# Patient Record
Sex: FEMALE | Race: WHITE | NOT HISPANIC OR LATINO | Employment: UNEMPLOYED | URBAN - METROPOLITAN AREA
[De-identification: names, ages, dates, MRNs, and addresses within clinical notes are randomized per-mention and may not be internally consistent; named-entity substitution may affect disease eponyms.]

---

## 2018-02-07 ENCOUNTER — OFFICE VISIT (OUTPATIENT)
Dept: OBSTETRICS AND GYNECOLOGY | Facility: CLINIC | Age: 53
End: 2018-02-07

## 2018-02-07 VITALS
SYSTOLIC BLOOD PRESSURE: 116 MMHG | HEIGHT: 55 IN | BODY MASS INDEX: 37.67 KG/M2 | WEIGHT: 162.8 LBS | DIASTOLIC BLOOD PRESSURE: 64 MMHG

## 2018-02-07 DIAGNOSIS — N39.0 URINARY TRACT INFECTION WITHOUT HEMATURIA, SITE UNSPECIFIED: Primary | ICD-10-CM

## 2018-02-07 DIAGNOSIS — Z01.419 GYNECOLOGIC EXAM NORMAL: ICD-10-CM

## 2018-02-07 LAB
BILIRUB BLD-MCNC: NEGATIVE MG/DL
BILIRUB BLD-MCNC: NEGATIVE MG/DL
CLARITY, POC: CLEAR
CLARITY, POC: CLEAR
COLOR UR: YELLOW
COLOR UR: YELLOW
GLUCOSE UR STRIP-MCNC: NEGATIVE MG/DL
GLUCOSE UR STRIP-MCNC: NEGATIVE MG/DL
KETONES UR QL: NEGATIVE
KETONES UR QL: NEGATIVE
LEUKOCYTE EST, POC: NEGATIVE
LEUKOCYTE EST, POC: NEGATIVE
NITRITE UR-MCNC: NEGATIVE MG/ML
NITRITE UR-MCNC: NEGATIVE MG/ML
PH UR: 5.5 [PH] (ref 5–8)
PH UR: 6 [PH] (ref 5–8)
PROT UR STRIP-MCNC: NEGATIVE MG/DL
PROT UR STRIP-MCNC: NEGATIVE MG/DL
RBC # UR STRIP: NEGATIVE /UL
RBC # UR STRIP: NEGATIVE /UL
SP GR UR: 1.01 (ref 1–1.03)
SP GR UR: 1.02 (ref 1–1.03)
UROBILINOGEN UR QL: NORMAL
UROBILINOGEN UR QL: NORMAL

## 2018-02-07 PROCEDURE — 99396 PREV VISIT EST AGE 40-64: CPT | Performed by: NURSE PRACTITIONER

## 2018-02-07 PROCEDURE — 81002 URINALYSIS NONAUTO W/O SCOPE: CPT | Performed by: NURSE PRACTITIONER

## 2018-02-07 RX ORDER — NALTREXONE HYDROCHLORIDE AND BUPROPION HYDROCHLORIDE 8; 90 MG/1; MG/1
TABLET, EXTENDED RELEASE ORAL
COMMUNITY
Start: 2018-01-25

## 2018-02-07 NOTE — PROGRESS NOTES
Shanice Gilmore is a 52 y.o. female.   Chief Complaint   Patient presents with   • Gynecologic Exam     Patient is here for a annual and issues with urine.      HPI:pt here for gyn exam, voices  No c/o    The following portions of the patient's history were reviewed and updated as appropriate: allergies, current medications, past family history, past medical history, past social history, past surgical history and problem list.    Review of Systems  Review of Systems   Constitutional: Negative.  Negative for unexpected weight change.   Respiratory: Negative for chest tightness and shortness of breath.    Cardiovascular: Negative for chest pain and palpitations.   Gastrointestinal: Negative for abdominal pain and blood in stool.   Endocrine: Negative.    Genitourinary: Negative for dyspareunia, dysuria, frequency, hematuria, menstrual problem, pelvic pain, vaginal bleeding, vaginal discharge and vaginal pain.   Musculoskeletal: Negative for joint swelling.   Skin: Negative for color change, rash and wound.   Allergic/Immunologic: Negative.    Psychiatric/Behavioral: Negative.    All other systems reviewed and are negative.      Objective   Physical Exam   Constitutional: She is oriented to person, place, and time. She appears well-developed and well-nourished.   HENT:   Head: Normocephalic.   Neck: Normal range of motion.   Cardiovascular: Normal rate and regular rhythm.    Pulmonary/Chest: Effort normal and breath sounds normal. Right breast exhibits no mass and no nipple discharge. Left breast exhibits no mass and no nipple discharge. There is no breast swelling.   Breasts soft without palpable masses   Abdominal: Soft. Bowel sounds are normal.   Genitourinary: Vagina normal and uterus normal. There is no rash or lesion on the right labia. There is no rash or lesion on the left labia. Cervix exhibits no friability. Right adnexum displays no mass, no tenderness and no fullness. Left adnexum displays no mass, no  tenderness and no fullness.   Genitourinary Comments: Ovaries  Within normal limits. Cervix  Within normal limits   Neurological: She is alert and oriented to person, place, and time.   Skin: Skin is warm and dry.   Psychiatric: She has a normal mood and affect. Her behavior is normal.   Vitals reviewed.      Assessment/Plan   Patient Instructions   Pt. Counseled today on safe sex practices, pap smear performed, self breast examinations discussed, if positive family history mammogram starting at age 35 otherwise starting at age 40. Colonoscopy recommended.  Hormone replacement therapy discussed. Aspirin prophylaxis to reduce risk of stroke.  Calcium and Vitamin D requirements discussed.  Diet and exercise also counseled.       Shanice was seen today for gynecologic exam.    Diagnoses and all orders for this visit:    Urinary tract infection without hematuria, site unspecified  -     POC Urinalysis Dipstick    Gynecologic exam normal  -     Pap IG, Rfx HPV ASCU - ThinPrep Vial, Cervix  -     Follicle Stimulating Hormone        Return in about 1 year (around 2/7/2019).

## 2018-02-08 LAB — FSH SERPL-ACNC: 158.6 MIU/ML

## 2018-02-12 LAB
CONV .: NORMAL
CYTOLOGIST CVX/VAG CYTO: NORMAL
CYTOLOGY CVX/VAG DOC THIN PREP: NORMAL
DX ICD CODE: NORMAL
DX ICD CODE: NORMAL
HIV 1 & 2 AB SER-IMP: NORMAL
Lab: NORMAL
OTHER STN SPEC: NORMAL
PATH REPORT.FINAL DX SPEC: NORMAL
STAT OF ADQ CVX/VAG CYTO-IMP: NORMAL

## 2018-02-22 ENCOUNTER — TELEPHONE (OUTPATIENT)
Dept: OBSTETRICS AND GYNECOLOGY | Facility: CLINIC | Age: 53
End: 2018-02-22

## 2018-02-22 NOTE — TELEPHONE ENCOUNTER
----- Message from MARTI Welsh sent at 2/22/2018 12:46 PM EST -----  Tell her the fsh showed menopause. MBB  ----- Message -----     From: Mary Stewart MA     Sent: 2/20/2018   2:21 PM       To: Brianda Nix, MARTI Welsh        ----- Message -----     From: Brianda Nix     Sent: 2/20/2018   2:14 PM       To: Mary Stewart MA    PT CALLED REQUESTING RESULTS OF HER RECENT LABS, ESPECIALLY REGARDING MENOPAUSE.  PLEASE CALL HER .[phone]